# Patient Record
Sex: MALE | Race: BLACK OR AFRICAN AMERICAN | Employment: OTHER | URBAN - METROPOLITAN AREA
[De-identification: names, ages, dates, MRNs, and addresses within clinical notes are randomized per-mention and may not be internally consistent; named-entity substitution may affect disease eponyms.]

---

## 2019-09-26 ENCOUNTER — HOSPITAL ENCOUNTER (OUTPATIENT)
Dept: GENERAL RADIOLOGY | Age: 59
Discharge: HOME | End: 2019-09-28
Payer: MEDICARE

## 2019-09-26 ENCOUNTER — HOSPITAL ENCOUNTER (OUTPATIENT)
Age: 59
Discharge: HOME | End: 2019-09-28
Payer: MEDICARE

## 2019-09-26 DIAGNOSIS — M54.9 DORSALGIA: ICD-10-CM

## 2019-09-26 PROCEDURE — 72050 X-RAY EXAM NECK SPINE 4/5VWS: CPT

## 2019-09-26 PROCEDURE — 72110 X-RAY EXAM L-2 SPINE 4/>VWS: CPT

## 2019-09-26 PROCEDURE — 72070 X-RAY EXAM THORAC SPINE 2VWS: CPT

## 2020-10-19 ENCOUNTER — TELEPHONE (OUTPATIENT)
Dept: HEMATOLOGY | Age: 60
End: 2020-10-19

## 2020-10-19 NOTE — TELEPHONE ENCOUNTER
Tried to reach out to the patient this afternoon to schedule him for a new patient consult from the [place] for colonoscopy, no answer, no voicemail set up to leave message  Electronically signed by Hansel Rao MA on 10/19/2020 at 12:21 PM

## 2020-10-21 ENCOUNTER — TELEPHONE (OUTPATIENT)
Dept: HEMATOLOGY | Age: 60
End: 2020-10-21

## 2020-10-21 NOTE — TELEPHONE ENCOUNTER
Called patient to schedule them a [facility] app[facility], there was no answer and no voicmail to leave a message  Electronically signed by Sun Palacios MA on 10/21/2020 at 9:58 AM

## 2020-10-29 ENCOUNTER — TELEPHONE (OUTPATIENT)
Dept: HEMATOLOGY | Age: 60
End: 2020-10-29

## 2020-10-29 NOTE — TELEPHONE ENCOUNTER
Called patient to try and schedule him for a new pt appt but was not able to leave a message, no voicemail availible  Electronically signed by Cathy García MA on 10/29/2020 at 12:53 PM

## 2020-11-02 ENCOUNTER — TELEPHONE (OUTPATIENT)
Dept: HEMATOLOGY | Age: 60
End: 2020-11-02

## 2020-11-02 NOTE — TELEPHONE ENCOUNTER
Patient was instructed that this is a new patient consult ONLY! I explained to the patient that they will come in our office and meet with Dr. Emanuel Luna first and then from there they will get a date as to when their colonoscopy will be scheduled. The patient was also instructed that they DO NOT have to do any prep the day of the consult. The patient confirmed.

## 2020-11-18 ENCOUNTER — TELEPHONE (OUTPATIENT)
Dept: HEMATOLOGY | Age: 60
End: 2020-11-18

## 2020-11-18 NOTE — TELEPHONE ENCOUNTER
Tried to call patient to remind him of his appt with dr Nahun Morales on 11/19/2020, there was no voicemail option not able to leave reminder message  Electronically signed by Paige Millard MA on 11/18/2020 at 8:06 AM

## 2020-11-19 ENCOUNTER — OFFICE VISIT (OUTPATIENT)
Dept: HEMATOLOGY | Age: 60
End: 2020-11-19
Payer: MEDICARE

## 2020-11-19 VITALS
HEART RATE: 82 BPM | WEIGHT: 208 LBS | HEIGHT: 72 IN | OXYGEN SATURATION: 98 % | DIASTOLIC BLOOD PRESSURE: 68 MMHG | BODY MASS INDEX: 28.17 KG/M2 | RESPIRATION RATE: 16 BRPM | SYSTOLIC BLOOD PRESSURE: 117 MMHG | TEMPERATURE: 98 F

## 2020-11-19 PROCEDURE — 99202 OFFICE O/P NEW SF 15 MIN: CPT | Performed by: TRANSPLANT SURGERY

## 2020-11-19 PROCEDURE — 99999 PR OFFICE/OUTPT VISIT,PROCEDURE ONLY: CPT | Performed by: TRANSPLANT SURGERY

## 2020-11-19 RX ORDER — INSULIN GLARGINE 100 [IU]/ML
20 INJECTION, SOLUTION SUBCUTANEOUS NIGHTLY
COMMUNITY

## 2020-11-19 RX ORDER — REPAGLINIDE 2 MG/1
2 TABLET ORAL
COMMUNITY

## 2020-11-19 RX ORDER — PREDNISONE 1 MG/1
5 TABLET ORAL DAILY
COMMUNITY

## 2020-11-19 RX ORDER — ASPIRIN 81 MG/1
81 TABLET ORAL DAILY
COMMUNITY

## 2020-11-19 RX ORDER — LOSARTAN POTASSIUM 100 MG/1
100 TABLET ORAL DAILY
COMMUNITY

## 2020-11-19 NOTE — PROGRESS NOTES
Hepatobiliary and Pancreatic Surgery Attending History and Physical       Date: November 19, 2020      CC: screening colonoscopy     HPI:  Patient is a very pleasant 63year old [place] male whom had a colonoscopy 11 years ago. A 9cm polyp was found and he underwent a laparoscopic left hemicolectomy and partial splenectomy. He has not had a colonoscopy since then. He moves his bowels daily and denies any weightloss. He denies any abdominal pain.   He has normal caliber stools and states that are brown in color.      Past Medical History   No past medical history on file.        Past Surgical History         Past Surgical History:   Procedure Laterality Date    CHOLECYSTECTOMY               Current Facility-Administered Medications          Current Outpatient Medications   Medication Sig Dispense Refill    vitamin E 1000 units capsule Take 1,000 Units by mouth daily        ibuprofen (ADVIL;MOTRIN) 800 MG tablet Take 800 mg by mouth every 6 hours as needed for Pain          No current facility-administered medications for this visit.            No Known Allergies     Family History   No family history on file.        Social History               Socioeconomic History    Marital status: Single       Spouse name: Not on file    Number of children: Not on file    Years of education: Not on file    Highest education level: Not on file   Occupational History    Not on file   Social Needs    Financial resource strain: Not on file    Food insecurity       Worry: Not on file       Inability: Not on file    Transportation needs       Medical: Not on file       Non-medical: Not on file   Tobacco Use    Smoking status: Former Smoker       Types: Cigars    Smokeless tobacco: Never Used   Substance and Sexual Activity    Alcohol use: No    Drug use: No    Sexual activity: Not on file   Lifestyle    Physical activity       Days per week: Not on file       Minutes per session: Not on file    Stress: Not on file Relationships    Social connections       Talks on phone: Not on file       Gets together: Not on file       Attends [employer] service: Not on file       Active member of club or organization: Not on file       Attends meetings of clubs or organizations: Not on file       Relationship status: Not on file    Intimate partner violence       Fear of current or ex partner: Not on file       Emotionally abused: Not on file       Physically abused: Not on file       Forced sexual activity: Not on file   Other Topics Concern    Not on file   Social History Narrative    Not on file           ROS:   Review of Systems   Constitutional: Negative for chills, diaphoresis and fever. HENT: Negative for congestion, ear discharge, ear pain, hearing loss, nosebleeds and tinnitus. Eyes: Negative for photophobia, pain and discharge. Respiratory: Negative for shortness of breath. Cardiovascular: Negative for palpitations and leg swelling. Gastrointestinal: Negative for abdominal pain, blood in stool, constipation, diarrhea, nausea and vomiting. Endocrine: Negative for polydipsia. Genitourinary: Negative for frequency, hematuria and urgency. Musculoskeletal: Negative for back pain and neck pain. Skin: Negative for rash. Allergic/Immunologic: Negative for environmental allergies. Neurological: Negative for tremors and seizures. Psychiatric/Behavioral: Negative for hallucinations and suicidal ideas. The patient is not nervous/anxious.          Physical Exam:      Vitals:     11/19/20 1229   BP: 109/65   Pulse: 89   Resp: 18   Temp: 98 °F (36.7 °C)   SpO2: 98%         PSYCH: mood and affect normal, alert and oriented x 3  CONSTITUTIONAL: No apparent distress, comfortable  EYES: Sclera white, pupils equal round and reactive to light  ENMT:  Hearing normal, trachea midline, ears externally intact  LYMPH: no lympadenopathy in neck.  Nolympadenopathy in groins  RESP: Breath sounds were clear and equal with no rales, wheezes, or rhonchi. Respiratory effort was normal with no retractions or use of accessory muscles. CV: Heart soundswere normal with a regular rate and rhythm. No pedal edema  GI/ Abdomen: Soft, nondistended, nontender, no guarding, no peritoneal signs, umbilical scar without hernia     MSK: no clubbing/ no cyanosis/ gaitnormal     Assessment/Plan:  Age related screening colonoscopy, history of left hemicolectomy  - Patient was made aware of the risks, benefits, alternatives, and complications of a Total colonoscopy with possible biopsy, polypectomy, or clipping and wish to proceed with surgery.       30 Minutes of which greater than 50% was spent counseling or coordinating her care.     Thank you for the consultation allowing me to take part in Ms. Jim's care.      Please send a copy of my note to Dr. Mendy Chaparro [facility] M[facility]  11/19/2020  1:56 PM

## 2020-11-24 ENCOUNTER — TELEPHONE (OUTPATIENT)
Dept: HEMATOLOGY | Age: 60
End: 2020-11-24

## 2020-11-24 NOTE — TELEPHONE ENCOUNTER
I scheduled patient for his colonoscopy on 1/19/20 at 12:30pm at [facility] from a [place] referral.  I have attempted to call patient several times to give him this appt information and he does not answer phone and unable to leave a message. He will need covid testing on 1/14/20 and he will need to hold his aspirin for 5 days prior to his scope.       Electronically signed by Le Moralez RN on 11/24/2020 at 8:22 AM

## 2020-11-30 ENCOUNTER — TELEPHONE (OUTPATIENT)
Dept: HEMATOLOGY | Age: 60
End: 2020-11-30

## 2020-11-30 NOTE — TELEPHONE ENCOUNTER
Another attempt was made to give patient his colonoscopy appt information and no answer and unable to leave a voicemail.     Electronically signed by Lanette Leiva RN on 11/30/2020 at 9:38 AM

## 2020-12-15 ENCOUNTER — TELEPHONE (OUTPATIENT)
Dept: HEMATOLOGY | Age: 60
End: 2020-12-15

## 2020-12-15 NOTE — TELEPHONE ENCOUNTER
I made another attempt to call patient to give him his colonoscopy date and time and the covid testing information with no answer and after several rings it goes to a busy signal.      Electronically signed by Mila Cristina RN on 12/15/2020 at 8:26 AM

## 2021-01-13 ENCOUNTER — TELEPHONE (OUTPATIENT)
Dept: HEMATOLOGY | Age: 61
End: 2021-01-13

## 2021-01-13 NOTE — TELEPHONE ENCOUNTER
I called [facility] and removed patient off schedule for colonoscopy due to inability to get in contact with the patient. I have tried multiple times to contact this patient to give him the colonoscopy information. I will reschedule when patient calls office.       Electronically signed by Linda Tellez RN on 1/13/2021 at 10:33 AM

## 2023-01-23 ENCOUNTER — OFFICE VISIT (OUTPATIENT)
Dept: PRIMARY CARE CLINIC | Age: 63
End: 2023-01-23
Payer: COMMERCIAL

## 2023-01-23 VITALS
SYSTOLIC BLOOD PRESSURE: 142 MMHG | OXYGEN SATURATION: 98 % | HEART RATE: 96 BPM | HEIGHT: 66 IN | DIASTOLIC BLOOD PRESSURE: 70 MMHG | TEMPERATURE: 99 F | BODY MASS INDEX: 32.47 KG/M2 | WEIGHT: 202 LBS

## 2023-01-23 DIAGNOSIS — Z79.4 TYPE 2 DIABETES MELLITUS WITHOUT COMPLICATION, WITH LONG-TERM CURRENT USE OF INSULIN (HCC): ICD-10-CM

## 2023-01-23 DIAGNOSIS — M16.0 PRIMARY OSTEOARTHRITIS OF BOTH HIPS: ICD-10-CM

## 2023-01-23 DIAGNOSIS — E11.9 TYPE 2 DIABETES MELLITUS WITHOUT COMPLICATION, WITH LONG-TERM CURRENT USE OF INSULIN (HCC): ICD-10-CM

## 2023-01-23 DIAGNOSIS — Z79.4 TYPE 2 DIABETES MELLITUS WITHOUT COMPLICATION, WITH LONG-TERM CURRENT USE OF INSULIN (HCC): Primary | ICD-10-CM

## 2023-01-23 DIAGNOSIS — E11.9 TYPE 2 DIABETES MELLITUS WITHOUT COMPLICATION, WITH LONG-TERM CURRENT USE OF INSULIN (HCC): Primary | ICD-10-CM

## 2023-01-23 DIAGNOSIS — I10 PRIMARY HYPERTENSION: ICD-10-CM

## 2023-01-23 DIAGNOSIS — S39.012A STRAIN OF LUMBAR REGION, INITIAL ENCOUNTER: ICD-10-CM

## 2023-01-23 LAB
ALBUMIN SERPL-MCNC: 4.1 G/DL (ref 3.5–5.2)
ALP BLD-CCNC: 88 U/L (ref 40–129)
ALT SERPL-CCNC: 59 U/L (ref 0–40)
ANION GAP SERPL CALCULATED.3IONS-SCNC: 12 MMOL/L (ref 7–16)
AST SERPL-CCNC: 40 U/L (ref 0–39)
BILIRUB SERPL-MCNC: 0.3 MG/DL (ref 0–1.2)
BUN BLDV-MCNC: 19 MG/DL (ref 6–23)
CALCIUM SERPL-MCNC: 9.6 MG/DL (ref 8.6–10.2)
CHLORIDE BLD-SCNC: 94 MMOL/L (ref 98–107)
CHOLESTEROL, TOTAL: 109 MG/DL (ref 0–199)
CO2: 24 MMOL/L (ref 22–29)
CREAT SERPL-MCNC: 1.2 MG/DL (ref 0.7–1.2)
GFR SERPL CREATININE-BSD FRML MDRD: >60 ML/MIN/1.73
GLUCOSE BLD-MCNC: 476 MG/DL (ref 74–99)
HBA1C MFR BLD: 11.7 % (ref 4–5.6)
HCT VFR BLD CALC: 33.3 % (ref 37–54)
HDLC SERPL-MCNC: 46 MG/DL
HEMOGLOBIN: 10.6 G/DL (ref 12.5–16.5)
LDL CHOLESTEROL CALCULATED: 48 MG/DL (ref 0–99)
MCH RBC QN AUTO: 25 PG (ref 26–35)
MCHC RBC AUTO-ENTMCNC: 31.8 % (ref 32–34.5)
MCV RBC AUTO: 78.5 FL (ref 80–99.9)
PDW BLD-RTO: 14.2 FL (ref 11.5–15)
PLATELET # BLD: 248 E9/L (ref 130–450)
PMV BLD AUTO: 12.5 FL (ref 7–12)
POTASSIUM SERPL-SCNC: 4.1 MMOL/L (ref 3.5–5)
RBC # BLD: 4.24 E12/L (ref 3.8–5.8)
SEDIMENTATION RATE, ERYTHROCYTE: 41 MM/HR (ref 0–15)
SODIUM BLD-SCNC: 130 MMOL/L (ref 132–146)
TOTAL PROTEIN: 7.4 G/DL (ref 6.4–8.3)
TRIGL SERPL-MCNC: 74 MG/DL (ref 0–149)
VLDLC SERPL CALC-MCNC: 15 MG/DL
WBC # BLD: 9.7 E9/L (ref 4.5–11.5)

## 2023-01-23 PROCEDURE — 99204 OFFICE O/P NEW MOD 45 MIN: CPT | Performed by: INTERNAL MEDICINE

## 2023-01-23 PROCEDURE — 3078F DIAST BP <80 MM HG: CPT | Performed by: INTERNAL MEDICINE

## 2023-01-23 PROCEDURE — 3077F SYST BP >= 140 MM HG: CPT | Performed by: INTERNAL MEDICINE

## 2023-01-23 RX ORDER — AMLODIPINE BESYLATE 10 MG/1
10 TABLET ORAL DAILY
COMMUNITY
Start: 2022-10-31

## 2023-01-23 RX ORDER — CYCLOBENZAPRINE HCL 5 MG
5 TABLET ORAL 2 TIMES DAILY PRN
Qty: 10 TABLET | Refills: 0 | Status: SHIPPED | OUTPATIENT
Start: 2023-01-23 | End: 2023-02-02

## 2023-01-23 RX ORDER — CARVEDILOL 25 MG/1
12.5 TABLET ORAL 2 TIMES DAILY
COMMUNITY
Start: 2022-05-24

## 2023-01-23 RX ORDER — ATORVASTATIN CALCIUM 40 MG/1
40 TABLET, FILM COATED ORAL DAILY
COMMUNITY
Start: 2022-11-15

## 2023-01-23 RX ORDER — GABAPENTIN 300 MG/1
600 CAPSULE ORAL 2 TIMES DAILY
COMMUNITY
Start: 2022-11-15

## 2023-01-23 RX ORDER — GLIPIZIDE 5 MG/1
5 TABLET ORAL 2 TIMES DAILY
COMMUNITY
Start: 2022-04-29

## 2023-01-23 RX ORDER — LOSARTAN POTASSIUM AND HYDROCHLOROTHIAZIDE 25; 100 MG/1; MG/1
1 TABLET ORAL DAILY
COMMUNITY
Start: 2022-11-15

## 2023-01-23 SDOH — ECONOMIC STABILITY: FOOD INSECURITY: WITHIN THE PAST 12 MONTHS, YOU WORRIED THAT YOUR FOOD WOULD RUN OUT BEFORE YOU GOT MONEY TO BUY MORE.: NEVER TRUE

## 2023-01-23 SDOH — ECONOMIC STABILITY: FOOD INSECURITY: WITHIN THE PAST 12 MONTHS, THE FOOD YOU BOUGHT JUST DIDN'T LAST AND YOU DIDN'T HAVE MONEY TO GET MORE.: NEVER TRUE

## 2023-01-23 ASSESSMENT — PATIENT HEALTH QUESTIONNAIRE - PHQ9
SUM OF ALL RESPONSES TO PHQ QUESTIONS 1-9: 0
2. FEELING DOWN, DEPRESSED OR HOPELESS: 0
SUM OF ALL RESPONSES TO PHQ QUESTIONS 1-9: 0
SUM OF ALL RESPONSES TO PHQ QUESTIONS 1-9: 0
1. LITTLE INTEREST OR PLEASURE IN DOING THINGS: 0
SUM OF ALL RESPONSES TO PHQ9 QUESTIONS 1 & 2: 0
SUM OF ALL RESPONSES TO PHQ QUESTIONS 1-9: 0

## 2023-01-23 ASSESSMENT — SOCIAL DETERMINANTS OF HEALTH (SDOH): HOW HARD IS IT FOR YOU TO PAY FOR THE VERY BASICS LIKE FOOD, HOUSING, MEDICAL CARE, AND HEATING?: NOT VERY HARD

## 2023-01-23 NOTE — PROGRESS NOTES
Karie Mckeon is a 58 y.o. male with the following history of DM,HTN ,S/P hemicolectomy right total hip 5 years ago been using cane since and follow at [place] .for evaluation as new patient fell yesterday on snow hurt right arm hip having back pain down right leg to foot no chest discomfort     Past Medical History:   Diagnosis Date    Hypertension     Type 2 diabetes mellitus without complication ([address]        Past Surgical History:   Procedure Laterality Date    SMALL INTESTINE SURGERY         No family history on file. Current Outpatient Medications:     gabapentin (NEURONTIN) 300 MG capsule, Take 600 mg by mouth in the morning and at bedtime. , Disp: , Rfl:     glipiZIDE (GLUCOTROL) 5 MG tablet, Take 5 mg by mouth in the morning and at bedtime, Disp: , Rfl:     losartan-hydroCHLOROthiazide (HYZAAR) 100-25 MG per tablet, Take 1 tablet by mouth daily, Disp: , Rfl:     carvedilol (COREG) 25 MG tablet, Take 12.5 mg by mouth in the morning and at bedtime, Disp: , Rfl:     amLODIPine (NORVASC) 10 MG tablet, Take 10 mg by mouth daily, Disp: , Rfl:     atorvastatin (LIPITOR) 40 MG tablet, Take 40 mg by mouth daily, Disp: , Rfl:     metFORMIN (GLUCOPHAGE) 1000 MG tablet, Take 1,000 mg by mouth 2 times daily (with meals), Disp: , Rfl:     aspirin 81 MG EC tablet, Take 81 mg by mouth daily, Disp: , Rfl:     insulin glargine (LANTUS) 100 UNIT/ML injection vial, Inject 20 Units into the skin nightly, Disp: , Rfl:     cyclobenzaprine (FLEXERIL) 5 MG tablet, Take 1 tablet by mouth 2 times daily as needed for Muscle spasms, Disp: 10 tablet, Rfl: 0    losartan (COZAAR) 100 MG tablet, Take 100 mg by mouth daily (Patient not taking: Reported on 1/23/2023), Disp: , Rfl:     repaglinide (PRANDIN) 2 MG tablet, Take 2 mg by mouth 3 times daily (before meals) (Patient not taking: Reported on 1/23/2023), Disp: , Rfl:     predniSONE (DELTASONE) 5 MG tablet, Take 5 mg by mouth daily (Patient not taking: Reported on 1/23/2023), Disp: , Rfl:     NONFORMULARY, Take 7.5 mg by mouth daily Meloxicam (Patient not taking: Reported on 1/23/2023), Disp: , Rfl:     cloNIDine (CATAPRES) 0.1 MG tablet, Take 1 tablet by mouth 2 times daily (Patient not taking: Reported on 1/23/2023), Disp: 20 tablet, Rfl: 0     Allergies: Patient has no known allergies. Social History     Tobacco Use    Smoking status: Every Day     Packs/day: 0.50     Years: 46.00     Pack years: 23.00     Types: Cigarettes     Start date: 1/1/1979     Passive exposure: Never    Smokeless tobacco: Never   Substance Use Topics    Alcohol use: Not Currently        Review of Systems:  Respiratory: negative for cough and hemoptysis  Cardiovascular: negative for chest pain and dyspnea  Gastrointestinal: negative for abdominal pain, diarrhea, nausea and vomiting  Genitourinary:negative for dysuria and hematuria  Derm: negative for rash and skin lesion(s)  Neurological: negative for seizures and tremors  Endocrine: negative for diabetic symptoms including polydipsia and polyuria    Physical Exam:  Vitals:    01/23/23 0837   BP: (!) 142/70   Pulse: 96   Temp: 99 °F (37.2 °C)   SpO2: 98%      Wt Readings from Last 3 Encounters:   01/23/23 202 lb (91.6 kg)   11/19/20 208 lb (94.3 kg)   07/20/17 182 lb (82.6 kg)       Skin:  Warm and dry. No rash or bruises  HEENT:  PERRLA, EOMI  Neck:  No JVD, No thyromegaly, No carotid bruit  Cardiac:  RRR, No gallop or murmur  Lungs:  CTA, Normal percussion  Abdomen: Normal bowel sounds, no HSM, non-tender  Paralumbar tenderness spasm   Extremities:  No clubbing, edema or cyanosis SLR on right to 80 with pain   Neurological:  Moves all extremities. no motor deficit     Lab Results   Component Value Date     07/20/2017    K 4.1 07/20/2017     07/20/2017    CO2 24 07/20/2017    BUN 8 07/20/2017    CREATININE 0.8 07/20/2017    GLUCOSE 160 (H) 07/20/2017    CALCIUM 9.4 07/20/2017    LABGLOM >60 07/20/2017    GFRAA >60 07/20/2017     Lab Results Component Value Date    WBC 6.1 07/20/2017    HGB 14.1 07/20/2017    HCT 43.2 07/20/2017    MCV 79.9 (L) 07/20/2017     07/20/2017     No results found for: CHOL, TRIG, HDL, LDLCHOLESTEROL, LDLCALC, LABVLDL, VLDL, CHOLHDLRATIO  No results found for: PSA, PSADIA   No results found for: LABA1C        Assessment and Plan:    Patient Active Problem List   Diagnosis    Type 2 diabetes mellitus without complication, with long-term current use of insulin ([facility] [facility]    Primary hypertension    Primary osteoarthritis of both hips    Lumbar strain         Diagnosis/Orders  1. Type 2 diabetes mellitus without complication, with long-term current use of insulin ([facility])  -     Comprehensive Metabolic Panel; Future  -     CBC; Future  -     Hemoglobin A1C; Future  -     LIPID PANEL; Future  -     Sedimentation Rate; Future  2. Primary hypertension  3. Primary osteoarthritis of both hips  4. Strain of lumbar region, initial encounter      Flexeril 5 mg bid  Check previous record  Advised to repeat colonoscopy  Need follow up with ortho     Return in about 4 weeks (around 2/20/2023).       Shanna Orozco MD

## 2023-01-27 ENCOUNTER — COMMUNITY OUTREACH (OUTPATIENT)
Dept: PRIMARY CARE CLINIC | Age: 63
End: 2023-01-27

## 2023-09-14 ENCOUNTER — TELEPHONE (OUTPATIENT)
Dept: ORTHOPEDIC SURGERY | Age: 63
End: 2023-09-14

## 2023-09-14 NOTE — TELEPHONE ENCOUNTER
Referral received from [place]. Per Dr. Danica Upton, schedule 9/20/23. Attempted to call patient to schedule appt 9/20/23 at 0830. No answer, left voicemail requesting call back to schedule.

## 2023-09-19 NOTE — TELEPHONE ENCOUNTER
Patient called in to schedule appointment with Dr. Comer Daily. Appointment scheduled for 9- @ 8:30 am. Patient said he will have to find transportation to the visit but will call back to cancel if he doesn't find a ride.

## 2023-10-06 DIAGNOSIS — M25.562 CHRONIC PAIN OF LEFT KNEE: Primary | ICD-10-CM

## 2023-10-06 DIAGNOSIS — G89.29 CHRONIC PAIN OF LEFT KNEE: Primary | ICD-10-CM

## 2023-10-10 ENCOUNTER — OFFICE VISIT (OUTPATIENT)
Dept: ORTHOPEDIC SURGERY | Age: 63
End: 2023-10-10

## 2023-10-10 VITALS — BODY MASS INDEX: 30.45 KG/M2 | HEIGHT: 67 IN | WEIGHT: 194 LBS

## 2023-10-10 DIAGNOSIS — M17.12 PRIMARY OSTEOARTHRITIS OF LEFT KNEE: Primary | ICD-10-CM

## 2023-10-10 RX ORDER — BUPIVACAINE HYDROCHLORIDE 2.5 MG/ML
3 INJECTION, SOLUTION INFILTRATION; PERINEURAL ONCE
Status: COMPLETED | OUTPATIENT
Start: 2023-10-10 | End: 2023-10-10

## 2023-10-10 RX ORDER — TRIAMCINOLONE ACETONIDE 40 MG/ML
80 INJECTION, SUSPENSION INTRA-ARTICULAR; INTRAMUSCULAR ONCE
Status: COMPLETED | OUTPATIENT
Start: 2023-10-10 | End: 2023-10-10

## 2023-10-10 RX ADMIN — BUPIVACAINE HYDROCHLORIDE 7.5 MG: 2.5 INJECTION, SOLUTION INFILTRATION; PERINEURAL at 09:30

## 2023-10-10 RX ADMIN — TRIAMCINOLONE ACETONIDE 80 MG: 40 INJECTION, SUSPENSION INTRA-ARTICULAR; INTRAMUSCULAR at 09:31

## 2023-10-10 NOTE — PATIENT INSTRUCTIONS
Left knee injected today    Follow-up in 3 months    Call sooner with any questions, concerns, issues with injection sites, or need for reevaluation.

## 2023-10-10 NOTE — PROGRESS NOTES
Chief Complaint   Patient presents with    Knee Pain     New patient presents with left knee pain. Has had pain since a fall in April. He has swelling and sharp pains. Unable to do daily activity       SUBJECTIVE: Patient is a very pleasant 50-year-old male comes in today accompanied by his wife for left knee pain. Patient states has been having pain for years is gotten to the point that it is very severe now. He had a total hip replacement on his right side in 2017 in [place]. Still with pain in his right leg but it is chronic. The left knee pain is really limiting his activity at this point time. He denies any falls or traumas. He does have diabetes his most recent hemoglobin A1c in our system was 11.7 although he states it is right around 7 at this point time from another lab. He would like to explore some conservative options for his left knee. Past Medical History:   Diagnosis Date    Hypertension     Type 2 diabetes mellitus without complication ([address])        Past Surgical History:   Procedure Laterality Date    SMALL INTESTINE SURGERY         History reviewed. No pertinent family history. Social History     Tobacco Use    Smoking status: Every Day     Packs/day: 0.50     Years: 46.00     Additional pack years: 0.00     Total pack years: 23.00     Types: Cigarettes     Start date: 1/1/1979     Passive exposure: Never    Smokeless tobacco: Never   Vaping Use    Vaping Use: Never used   Substance Use Topics    Alcohol use: Not Currently    Drug use: No       No Known Allergies      Review of Systems   Constitutional: Negative for fever, chills, diaphoresis, appetite change and fatigue. HENT: Negative for dental issues, hearing loss and tinnitus. Negative for congestion, sinus pressure, sneezing, sore throat. Negative for headache. Eyes: Negative for visual disturbance, blurred and double vision.  Negative for pain, discharge, redness and itching  Respiratory: Negative for cough,

## 2024-04-16 ENCOUNTER — HOSPITAL ENCOUNTER (OUTPATIENT)
Dept: CT IMAGING | Age: 64
Discharge: HOME | End: 2024-04-18
Payer: MEDICARE

## 2024-04-16 DIAGNOSIS — F17.210 CIGARETTE SMOKER: ICD-10-CM

## 2024-04-16 DIAGNOSIS — Z87.891 PERSONAL HISTORY OF TOBACCO USE, PRESENTING HAZARDS TO HEALTH: ICD-10-CM

## 2024-04-16 PROCEDURE — 71271 CT THORAX LUNG CANCER SCR C-: CPT

## 2024-04-17 ENCOUNTER — TELEPHONE (OUTPATIENT)
Dept: CASE MANAGEMENT | Age: 64
End: 2024-04-17

## 2024-04-17 NOTE — TELEPHONE ENCOUNTER
No call, encounter opened to process CT Lung Screening.    CT Lung Screen: 4/16/2024    IMPRESSION:  1. There is no pulmonary infiltrate, mass or suspicious pulmonary nodule.  2. Significant enlargement of the right thyroid lobe measuring approximately  7 cm x 4 cm x 4.5 cm.  Dedicated ultrasound examination of the thyroid gland  is recommended.  3. Significant calcified plaque within the coronary arteries.     LUNG RADS:  Lung-RADS 2 - Benign (v2022)     Management:  12 month screening LDCT     RECOMMENDATIONS:  If you would like to register your patient with the [facility] Lung Nodule/Lung  Cancer Screening Program, please contact the Nurse Navigator at  [phone].    Pack years: 22.6    Social History     Tobacco Use  Smoking Status: 1979   Start Date:    Quit Date:    Types: Cigarettes   Packs/Day: 0.5   Years: 45.3   Pack Years: 22.6   Smokeless Tobacco: Never         Results letter sent to patient via my chart or mailed.     Александр Yeh  Imaging Navigator   [facility]   [phone]

## 2024-05-17 ENCOUNTER — HOSPITAL ENCOUNTER (OUTPATIENT)
Dept: ULTRASOUND IMAGING | Age: 64
End: 2024-05-17
Payer: MEDICARE

## 2024-05-17 DIAGNOSIS — E04.9 ENLARGEMENT OF THYROID: ICD-10-CM

## 2024-05-17 PROCEDURE — 76536 US EXAM OF HEAD AND NECK: CPT

## 2025-04-18 ENCOUNTER — TELEMEDICINE (OUTPATIENT)
Dept: ENDOCRINOLOGY | Age: 65
End: 2025-04-18

## 2025-04-18 DIAGNOSIS — E04.2 MULTINODULAR GOITER: Primary | ICD-10-CM

## 2025-04-18 DIAGNOSIS — E78.2 MIXED HYPERLIPIDEMIA: ICD-10-CM

## 2025-04-18 NOTE — PROGRESS NOTES
[facility] PHYSICIANS [facility] Department of Endocrinology Diabetes and Metabolism   [facility] PHYSICIANS [place] SPECIALTY [facility] BD ENDO  [address], [address]  [facility] [address]  Dept: [phone]  Loc: [phone]     Date of Service: 4/18/2025    Primary Care Physician: Danielle Crawford MD  Referring physician: Danielle Crawford MD  Provider: Brant Allen MD            Reason for the visit:  Multinodular goiter       History of Present Illness:  The history is provided by the patient. No  was used. Accuracy of the patient data is excellent.  Tavares Knox is a very pleasant 65 y.o. male seen today for evaluation and management of multinodular goiter     The patient was found to have thyroid nodule on a routine physical examination     Thyroid ultrasound on May 17, 2024 showed a right thyroid lobe measuring 8.0 x 5.5 x 4.0 cm, left thyroid lobe measuring 4.8 x 1.9 x 1.6 cm, isthmus measuring 0.4 cm.  In the right thyroid lobe there is a 5.7 x 4.8 x 3.6 cm solid isoechoic thyroid nodule.      I have reviewed the previous record, no TFTs    FNA was not done    Tavares Knox denies any oice change, or shortness of breath. No family history of thyroid cancer. No prior history of radiation to head or neck region.    PAST MEDICAL HISTORY   Past Medical History:   Diagnosis Date    Hypertension     Type 2 diabetes mellitus without complication (HCC)        PAST SURGICAL HISTORY   Past Surgical History:   Procedure Laterality Date    SMALL INTESTINE SURGERY         SOCIAL HISTORY   Tobacco:   reports that he has been smoking cigarettes. He started smoking about 46 years ago. He has a 23.1 pack-year smoking history. He has never been exposed to tobacco smoke. He has never used smokeless tobacco.  Alcohol:   reports that he does not currently use alcohol.  Drugs:   reports no history of drug use.    FAMILY HISTORY   No family history on file.    ALLERGIES AND DRUG

## 2025-04-18 NOTE — PROGRESS NOTES
Tavares Knox was read the following message “We want to confirm that, for purposes of billing, this is a virtual visit with your provider for which we will submit a claim for reimbursement with your insurance company. You will be responsible for any copays, coinsurance amounts or other amounts not covered by your insurance company. If you do not accept this, unfortunately we will not be able to schedule or proceed with a virtual visit with the provider. Do you accept? Tavares responded Yes .

## 2025-06-04 ENCOUNTER — HOSPITAL ENCOUNTER (OUTPATIENT)
Age: 65
Discharge: HOME | End: 2025-06-04
Payer: MEDICARE

## 2025-06-04 ENCOUNTER — HOSPITAL ENCOUNTER (OUTPATIENT)
Dept: ULTRASOUND IMAGING | Age: 65
Discharge: HOME | End: 2025-06-06
Payer: MEDICARE

## 2025-06-04 DIAGNOSIS — E04.2 MULTINODULAR GOITER: ICD-10-CM

## 2025-06-04 LAB
T4 FREE SERPL-MCNC: 1.1 NG/DL (ref 0.9–1.7)
TSH SERPL DL<=0.05 MIU/L-ACNC: 1.86 UIU/ML (ref 0.27–4.2)

## 2025-06-04 PROCEDURE — 84439 ASSAY OF FREE THYROXINE: CPT

## 2025-06-04 PROCEDURE — 84443 ASSAY THYROID STIM HORMONE: CPT

## 2025-06-04 PROCEDURE — 76536 US EXAM OF HEAD AND NECK: CPT

## 2025-06-04 PROCEDURE — 36415 COLL VENOUS BLD VENIPUNCTURE: CPT

## 2025-06-06 ENCOUNTER — RESULTS FOLLOW-UP (OUTPATIENT)
Dept: ENDOCRINOLOGY | Age: 65
End: 2025-06-06

## 2025-06-06 NOTE — TELEPHONE ENCOUNTER
----- Message from CORWIN White CNP sent at 6/6/2025  9:11 AM EDT -----  Patient know thyroid levels are at goal, however ultrasound of the thyroid, again, recommending FNA.    FNA ordered by Dr. Allen  in April.  Please give patient information for scheduling.

## 2025-06-25 ENCOUNTER — HOSPITAL ENCOUNTER (OUTPATIENT)
Dept: ULTRASOUND IMAGING | Age: 65
Discharge: HOME | End: 2025-06-27
Payer: MEDICARE

## 2025-06-25 DIAGNOSIS — E04.2 MULTINODULAR GOITER: ICD-10-CM

## 2025-06-25 PROCEDURE — 6360000002 HC RX W HCPCS: Performed by: SPECIALIST

## 2025-06-25 PROCEDURE — 88305 TISSUE EXAM BY PATHOLOGIST: CPT

## 2025-06-25 PROCEDURE — 88173 CYTOPATH EVAL FNA REPORT: CPT

## 2025-06-25 PROCEDURE — 10005 FNA BX W/US GDN 1ST LES: CPT

## 2025-06-25 RX ORDER — LIDOCAINE HYDROCHLORIDE 20 MG/ML
INJECTION, SOLUTION EPIDURAL; INFILTRATION; INTRACAUDAL; PERINEURAL PRN
Status: COMPLETED | OUTPATIENT
Start: 2025-06-25 | End: 2025-06-25

## 2025-06-25 RX ADMIN — LIDOCAINE HYDROCHLORIDE 5 ML: 20 INJECTION, SOLUTION EPIDURAL; INFILTRATION; INTRACAUDAL; PERINEURAL at 09:36

## 2025-06-26 LAB — NON-GYN CYTOLOGY REPORT: NORMAL
